# Patient Record
Sex: FEMALE | Race: BLACK OR AFRICAN AMERICAN | NOT HISPANIC OR LATINO | Employment: OTHER | URBAN - METROPOLITAN AREA
[De-identification: names, ages, dates, MRNs, and addresses within clinical notes are randomized per-mention and may not be internally consistent; named-entity substitution may affect disease eponyms.]

---

## 2024-11-12 ENCOUNTER — APPOINTMENT (OUTPATIENT)
Dept: PRIMARY CARE | Facility: CLINIC | Age: 39
End: 2024-11-12
Payer: MEDICARE

## 2024-11-12 VITALS
OXYGEN SATURATION: 99 % | HEART RATE: 63 BPM | TEMPERATURE: 98 F | WEIGHT: 130 LBS | DIASTOLIC BLOOD PRESSURE: 74 MMHG | HEIGHT: 67 IN | SYSTOLIC BLOOD PRESSURE: 107 MMHG | BODY MASS INDEX: 20.4 KG/M2 | RESPIRATION RATE: 18 BRPM

## 2024-11-12 DIAGNOSIS — Z00.00 HEALTHCARE MAINTENANCE: Primary | ICD-10-CM

## 2024-11-12 DIAGNOSIS — L70.0 ACNE VULGARIS: ICD-10-CM

## 2024-11-12 DIAGNOSIS — R00.2 PALPITATION: ICD-10-CM

## 2024-11-12 DIAGNOSIS — D50.0 IRON DEFICIENCY ANEMIA DUE TO CHRONIC BLOOD LOSS: ICD-10-CM

## 2024-11-12 DIAGNOSIS — E55.9 HYPOVITAMINOSIS D: ICD-10-CM

## 2024-11-12 PROCEDURE — 99385 PREV VISIT NEW AGE 18-39: CPT | Performed by: INTERNAL MEDICINE

## 2024-11-12 PROCEDURE — 3008F BODY MASS INDEX DOCD: CPT | Performed by: INTERNAL MEDICINE

## 2024-11-12 PROCEDURE — 99203 OFFICE O/P NEW LOW 30 MIN: CPT | Performed by: INTERNAL MEDICINE

## 2024-11-12 ASSESSMENT — ENCOUNTER SYMPTOMS
AGITATION: 0
ACTIVITY CHANGE: 0
WEAKNESS: 0
CHILLS: 0
NAUSEA: 0
SHORTNESS OF BREATH: 0
DIARRHEA: 0
CHEST TIGHTNESS: 0
MYALGIAS: 0

## 2024-11-12 NOTE — PROGRESS NOTES
"Subjective   Patient ID: Dilia Groves is a 39 y.o. female who presents for Follow-up. She went to [facility] a few weeks ago and a ECG was done.  She has episodes where she feels chest pressure and palpitations that usually wakes her up.  She has no associated tachycardia or diaphoresis.  She has a family history of early cardiac disease.  So she is somewhat worried about that being a problem.    HPI     Review of Systems   Constitutional:  Negative for activity change and chills.   HENT:  Negative for congestion.    Respiratory:  Negative for chest tightness and shortness of breath.    Cardiovascular:  Positive for chest pain and palpitations.   Gastrointestinal:  Negative for constipation, diarrhea and nausea.   Musculoskeletal:  Negative for gait problem and myalgias.   Neurological:  Negative for weakness.   Psychiatric/Behavioral:  Negative for agitation, behavioral problems and sleep disturbance. The patient is not nervous/anxious.        Objective   /74 (BP Location: Left arm, Patient Position: Sitting)   Pulse 63   Temp 36.7 °C (98 °F)   Resp 18   Ht 1.702 m (5' 7\")   Wt 59 kg (130 lb)   SpO2 99%   BMI 20.36 kg/m²     Physical Exam  Constitutional:       Appearance: Normal appearance.   HENT:      Head: Normocephalic and atraumatic.      Nose: Nose normal.      Mouth/Throat:      Mouth: Mucous membranes are moist.   Eyes:      Extraocular Movements: Extraocular movements intact.      Pupils: Pupils are equal, round, and reactive to light.   Cardiovascular:      Rate and Rhythm: Normal rate and regular rhythm.   Pulmonary:      Effort: No respiratory distress.      Breath sounds: No wheezing.   Abdominal:      General: Bowel sounds are normal.      Palpations: Abdomen is soft.   Neurological:      General: No focal deficit present.       Assessment/Plan   Assessment & Plan  Healthcare maintenance  We will check screening labs as needed.  Orders:    Comprehensive Metabolic Panel; Future    Lipid " Panel; Future    Hypovitaminosis D  We talked about taking vitamin D replacement and she agrees.  Orders:    Vitamin D 25-Hydroxy,Total (for eval of Vitamin D levels); Future    Iron deficiency anemia due to chronic blood loss  She continues to have regular periods so we will check her blood count.       Palpitation  We will check screening labs and then advise after that.  Orders:    Thyroid Stimulating Hormone; Future    Acne vulgaris  .  Acne has been slightly better controlled lately.

## 2024-11-13 PROBLEM — D50.0 IRON DEFICIENCY ANEMIA DUE TO CHRONIC BLOOD LOSS: Status: ACTIVE | Noted: 2024-11-13

## 2024-11-13 PROBLEM — Z00.00 HEALTHCARE MAINTENANCE: Status: ACTIVE | Noted: 2024-11-13

## 2024-11-13 PROBLEM — L70.0 ACNE VULGARIS: Status: ACTIVE | Noted: 2024-11-13

## 2024-11-13 PROBLEM — R00.2 PALPITATION: Status: ACTIVE | Noted: 2024-11-13

## 2024-11-13 PROBLEM — E55.9 HYPOVITAMINOSIS D: Status: ACTIVE | Noted: 2024-11-13

## 2024-11-13 ASSESSMENT — ENCOUNTER SYMPTOMS
NERVOUS/ANXIOUS: 0
CONSTIPATION: 0
PALPITATIONS: 1
SLEEP DISTURBANCE: 0

## 2024-11-13 NOTE — ASSESSMENT & PLAN NOTE
We talked about taking vitamin D replacement and she agrees.  Orders:    Vitamin D 25-Hydroxy,Total (for eval of Vitamin D levels); Future

## 2024-11-13 NOTE — ASSESSMENT & PLAN NOTE
We will check screening labs and then advise after that.  Orders:    Thyroid Stimulating Hormone; Future

## 2024-11-13 NOTE — ASSESSMENT & PLAN NOTE
We will check screening labs as needed.  Orders:    Comprehensive Metabolic Panel; Future    Lipid Panel; Future

## 2024-11-19 ENCOUNTER — APPOINTMENT (OUTPATIENT)
Dept: PRIMARY CARE | Facility: CLINIC | Age: 39
End: 2024-11-19
Payer: MEDICARE